# Patient Record
Sex: FEMALE | Race: WHITE | NOT HISPANIC OR LATINO | Employment: FULL TIME | URBAN - METROPOLITAN AREA
[De-identification: names, ages, dates, MRNs, and addresses within clinical notes are randomized per-mention and may not be internally consistent; named-entity substitution may affect disease eponyms.]

---

## 2018-04-26 ENCOUNTER — OFFICE VISIT (OUTPATIENT)
Dept: OPHTHALMOLOGY | Facility: CLINIC | Age: 38
End: 2018-04-26
Payer: COMMERCIAL

## 2018-04-26 DIAGNOSIS — Z98.890 HX OF LASIK: ICD-10-CM

## 2018-04-26 DIAGNOSIS — D31.31 CHOROIDAL NEVUS, RIGHT EYE: ICD-10-CM

## 2018-04-26 DIAGNOSIS — G51.4 EYELID MYOKYMIA: Primary | ICD-10-CM

## 2018-04-26 PROCEDURE — 92004 COMPRE OPH EXAM NEW PT 1/>: CPT | Mod: S$GLB,,, | Performed by: OPTOMETRIST

## 2018-04-26 PROCEDURE — 99999 PR PBB SHADOW E&M-NEW PATIENT-LVL II: CPT | Mod: PBBFAC,,, | Performed by: OPTOMETRIST

## 2018-04-26 PROCEDURE — 92015 DETERMINE REFRACTIVE STATE: CPT | Mod: S$GLB,,, | Performed by: OPTOMETRIST

## 2018-04-26 RX ORDER — NORGESTIMATE AND ETHINYL ESTRADIOL 7DAYSX3 28
KIT ORAL
Refills: 11 | COMMUNITY
Start: 2018-03-30 | End: 2019-10-14

## 2018-04-26 RX ORDER — LEVOTHYROXINE SODIUM 75 UG/1
TABLET ORAL
Refills: 2 | COMMUNITY
Start: 2018-03-19 | End: 2019-10-14

## 2018-04-26 RX ORDER — LIOTHYRONINE SODIUM 25 UG/1
TABLET ORAL
Refills: 5 | COMMUNITY
Start: 2018-03-30 | End: 2019-10-14

## 2018-04-26 NOTE — PROGRESS NOTES
HPI     Concerns About Ocular Health    Additional comments: pt states that her left top eyelid has been   twitching for about 1 week now.             Comments   pt states that her left top eyelid has been twitching for about 1 week   now.    No dm  lasik ou  2012         Last edited by Salomon Connolly, OD on 4/26/2018  8:46 AM. (History)            Assessment /Plan     For exam results, see Encounter Report.    Eyelid myokymia    Hx of LASIK    Choroidal nevus, right eye      Discussed myokymia in detail. Reduce caffeine, stress, increase sleep.    Previous -4.75 myope. Stable LASIK, no holes, tears or detachments.    Longstanding choroidal nevus, unchanged.    RTC 3-5 years

## 2018-04-26 NOTE — PATIENT INSTRUCTIONS
Eyelid myokymia     Hx of LASIK     Choroidal nevus, right eye        Discussed myokymia in detail. Reduce caffeine, stress, increase sleep.     Previous -4.75 myope. Stable LASIK, no holes, tears or detachments.     Longstanding choroidal nevus, unchanged.     RTC 3-5 years

## 2019-10-14 ENCOUNTER — OFFICE VISIT (OUTPATIENT)
Dept: URGENT CARE | Facility: CLINIC | Age: 39
End: 2019-10-14
Payer: COMMERCIAL

## 2019-10-14 VITALS
BODY MASS INDEX: 28.93 KG/M2 | HEART RATE: 107 BPM | OXYGEN SATURATION: 99 % | SYSTOLIC BLOOD PRESSURE: 116 MMHG | WEIGHT: 180 LBS | TEMPERATURE: 101 F | DIASTOLIC BLOOD PRESSURE: 56 MMHG | HEIGHT: 66 IN

## 2019-10-14 DIAGNOSIS — J02.9 SORE THROAT: Primary | ICD-10-CM

## 2019-10-14 DIAGNOSIS — Z20.828 EXPOSURE TO THE FLU: ICD-10-CM

## 2019-10-14 LAB
CTP QC/QA: YES
CTP QC/QA: YES
POC MOLECULAR INFLUENZA A AGN: NEGATIVE
POC MOLECULAR INFLUENZA B AGN: NEGATIVE
S PYO RRNA THROAT QL PROBE: NEGATIVE

## 2019-10-14 PROCEDURE — 99999 PR PBB SHADOW E&M-EST. PATIENT-LVL III: ICD-10-PCS | Mod: PBBFAC,,, | Performed by: PHYSICIAN ASSISTANT

## 2019-10-14 PROCEDURE — 87880 POCT RAPID STREP A: ICD-10-PCS | Mod: QW,S$GLB,, | Performed by: PHYSICIAN ASSISTANT

## 2019-10-14 PROCEDURE — 99213 PR OFFICE/OUTPT VISIT, EST, LEVL III, 20-29 MIN: ICD-10-PCS | Mod: S$GLB,,, | Performed by: PHYSICIAN ASSISTANT

## 2019-10-14 PROCEDURE — 87502 INFLUENZA DNA AMP PROBE: CPT | Mod: QW,S$GLB,, | Performed by: PHYSICIAN ASSISTANT

## 2019-10-14 PROCEDURE — 87502 POCT INFLUENZA A/B MOLECULAR: ICD-10-PCS | Mod: QW,S$GLB,, | Performed by: PHYSICIAN ASSISTANT

## 2019-10-14 PROCEDURE — 87880 STREP A ASSAY W/OPTIC: CPT | Mod: QW,S$GLB,, | Performed by: PHYSICIAN ASSISTANT

## 2019-10-14 PROCEDURE — 87081 CULTURE SCREEN ONLY: CPT

## 2019-10-14 PROCEDURE — 3008F BODY MASS INDEX DOCD: CPT | Mod: CPTII,S$GLB,, | Performed by: PHYSICIAN ASSISTANT

## 2019-10-14 PROCEDURE — 99999 PR PBB SHADOW E&M-EST. PATIENT-LVL III: CPT | Mod: PBBFAC,,, | Performed by: PHYSICIAN ASSISTANT

## 2019-10-14 PROCEDURE — 99213 OFFICE O/P EST LOW 20 MIN: CPT | Mod: S$GLB,,, | Performed by: PHYSICIAN ASSISTANT

## 2019-10-14 PROCEDURE — 3008F PR BODY MASS INDEX (BMI) DOCUMENTED: ICD-10-PCS | Mod: CPTII,S$GLB,, | Performed by: PHYSICIAN ASSISTANT

## 2019-10-14 RX ORDER — ACETAMINOPHEN 500 MG
1 TABLET ORAL
COMMUNITY
Start: 2019-10-08

## 2019-10-14 RX ORDER — LEVOTHYROXINE SODIUM 88 UG/1
88 TABLET ORAL
COMMUNITY
Start: 2019-10-08 | End: 2020-10-07

## 2019-10-14 RX ORDER — METFORMIN HYDROCHLORIDE 500 MG/1
1500 TABLET, EXTENDED RELEASE ORAL
COMMUNITY
Start: 2019-10-08 | End: 2022-03-17

## 2019-10-14 RX ORDER — NORGESTIMATE AND ETHINYL ESTRADIOL 7DAYSX3 LO
KIT ORAL
COMMUNITY
End: 2022-03-17 | Stop reason: SDUPTHER

## 2019-10-14 NOTE — PROGRESS NOTES
"Subjective:      Patient ID: April D Albert is a 39 y.o. female.    Chief Complaint: Nasal Congestion    URI    This is a new problem. The current episode started yesterday ( was diagnosed with strep/flu over the weekend). The problem has been gradually worsening. The maximum temperature recorded prior to her arrival was 101 - 101.9 F. Associated symptoms include congestion, coughing, headaches, sinus pain and a sore throat. Pertinent negatives include no chest pain, ear pain, rhinorrhea, sneezing or wheezing. Treatments tried: mucinex      Review of Systems   Constitutional: Negative for chills, diaphoresis, fatigue and fever.   HENT: Positive for congestion, sinus pain and sore throat. Negative for ear discharge, ear pain, postnasal drip, rhinorrhea, sinus pressure and sneezing.    Respiratory: Positive for cough. Negative for shortness of breath and wheezing.    Cardiovascular: Negative for chest pain and palpitations.   Musculoskeletal: Negative for back pain and myalgias.   Neurological: Positive for headaches.       Objective:   BP (!) 116/56 (BP Location: Right arm, Patient Position: Sitting, BP Method: Medium (Automatic))   Pulse 107   Temp (!) 101.4 °F (38.6 °C) (Tympanic)   Ht 5' 6" (1.676 m)   Wt 81.7 kg (180 lb 0.1 oz)   LMP 09/12/2019 (Exact Date)   SpO2 99%   BMI 29.05 kg/m²   Physical Exam   Constitutional: She is oriented to person, place, and time. She appears well-developed and well-nourished. No distress.   HENT:   Head: Normocephalic.   Right Ear: External ear and ear canal normal. Right ear erythematous TM: dull tm    Left Ear: External ear and ear canal normal. Left ear erythematous TM: dull tm    Nose: Nose normal. No mucosal edema or rhinorrhea. Right sinus exhibits no maxillary sinus tenderness and no frontal sinus tenderness. Left sinus exhibits no maxillary sinus tenderness and no frontal sinus tenderness.   Mouth/Throat: Uvula is midline and mucous membranes are normal. No " oropharyngeal exudate, posterior oropharyngeal edema or posterior oropharyngeal erythema (mild ).   Eyes: Conjunctivae and EOM are normal.   Neck: Normal range of motion. Neck supple.   Cardiovascular: Normal rate, regular rhythm and normal heart sounds.   Pulmonary/Chest: Effort normal and breath sounds normal. No accessory muscle usage. No apnea, no tachypnea and no bradypnea. No respiratory distress. She has no decreased breath sounds. She has no wheezes. She has no rhonchi. She has no rales.   Lymphadenopathy:        Head (right side): No submental, no submandibular and no tonsillar adenopathy present.        Head (left side): No submental, no submandibular and no tonsillar adenopathy present.     She has no cervical adenopathy.   Neurological: She is alert and oriented to person, place, and time.   Skin: Skin is warm and dry. She is not diaphoretic.     Assessment:      1. Sore throat    2. Exposure to the flu       Plan:   Sore throat  -     POCT rapid strep A  -     Strep A culture, throat    Exposure to the flu  -     POCT Influenza A/B Molecular    Strep/Flu Exposure    -  Rapid strep/molecular flu tests negative    -  Recommended supportive care (increase fluids, mucinex to thin mucous, humidifier, prop up at night, flonase for nasal congestion, antihistamine if post nasal drip (hold if mucous is thick)     AVS provided and instructions reviewed with patient. Patient was counseled on supportive care and instructed to return or contact primary care provider if condition does not improve or for any new or worsening symptoms.    Christine Monzon PA-C   Physician Assistant   [facility]

## 2019-10-14 NOTE — PATIENT INSTRUCTIONS
Increase fluids   mucinex to thin mucous  Humidifier  Prop up at night   flonase for nasal congestion   Antihistamine if post nasal drip (hold if mucous is thick)

## 2019-10-14 NOTE — LETTER
October 14, 2019      [facility]  [address]  [facility] [address]  Phone: [phone]  Fax: [phone]       Patient: Didi Price   YOB: 1980  Date of Visit: 10/14/2019    To Whom It May Concern:    Aileen Price  was at [facility] on 10/14/2019. She may return to work on 10/16/2019 with no restrictions. If you have any questions or concerns, or if I can be of further assistance, please do not hesitate to contact me.    Sincerely,    Christine Monzon PA-C

## 2019-10-17 LAB — BACTERIA THROAT CULT: NORMAL

## 2025-03-10 ENCOUNTER — OFFICE VISIT (OUTPATIENT)
Dept: OPHTHALMOLOGY | Facility: CLINIC | Age: 45
End: 2025-03-10
Payer: COMMERCIAL

## 2025-03-10 DIAGNOSIS — Z98.890 HISTORY OF REFRACTIVE SURGERY: ICD-10-CM

## 2025-03-10 DIAGNOSIS — H52.7 REFRACTIVE ERROR: ICD-10-CM

## 2025-03-10 DIAGNOSIS — D31.91 NEVUS OF RIGHT EYE: ICD-10-CM

## 2025-03-10 DIAGNOSIS — H52.13 MYOPIA OF BOTH EYES: Primary | ICD-10-CM

## 2025-03-10 PROCEDURE — 99999 PR PBB SHADOW E&M-EST. PATIENT-LVL III: CPT | Mod: PBBFAC,,, | Performed by: OPTOMETRIST

## 2025-03-10 PROCEDURE — 92015 DETERMINE REFRACTIVE STATE: CPT | Mod: S$GLB,,, | Performed by: OPTOMETRIST

## 2025-03-10 PROCEDURE — 92004 COMPRE OPH EXAM NEW PT 1/>: CPT | Mod: S$GLB,,, | Performed by: OPTOMETRIST

## 2025-03-10 NOTE — PROGRESS NOTES
HPI     Annual Exam            Comments: NP to DKT  Patient here today for yearly eye exam           Comments    Vision changes since last eye exam?: None noticed  No correction      Any eye pain today: No    Other ocular symptoms: No    Interested in contact lens fitting today? No    1. Eyelid Myokymia  2. HX of Lasik  3. Nevus OD              Last edited by Vanessa Norris, PCT on 3/10/2025 10:48 AM.            Assessment /Plan     For exam results, see Encounter Report.    1. Myopia of both eyes  Eyeglass Final Rx       Eyeglass Final Rx         Sphere Cylinder Axis Add    Right -0.50 +0.25 046 +1.50    Left -0.25 +0.25 172 +1.50      Expiration Date: 3/10/2026                    2. History of refractive surgery  S/p Lasik OU, doing well     3. Nevus of right eye  Longstanding, flat   -     Color Fundus Photography - OU - Both Eyes    4. Refractive error  See #1       RTC 1 year with OPTOS or PRN

## 2025-05-08 ENCOUNTER — PATIENT MESSAGE (OUTPATIENT)
Dept: RESEARCH | Facility: HOSPITAL | Age: 45
End: 2025-05-08
Payer: COMMERCIAL